# Patient Record
Sex: MALE | URBAN - METROPOLITAN AREA
[De-identification: names, ages, dates, MRNs, and addresses within clinical notes are randomized per-mention and may not be internally consistent; named-entity substitution may affect disease eponyms.]

---

## 2024-06-10 ENCOUNTER — NURSE TRIAGE (OUTPATIENT)
Dept: NURSING | Facility: CLINIC | Age: 44
End: 2024-06-10

## 2024-06-10 NOTE — TELEPHONE ENCOUNTER
Nurse Triage SBAR    Is this a 2nd Level Triage? NO    Situation:   Chest pain/difficulty breathing    Background:   Pt had a fall on Thursday.  He was walking and rolled his ankle.  He tried to catch himself but ended up tripping and he fell on his left side.  He fell on his arm and felt the pain in his left chest and arm.    Assessment:   He thought he was improving as his chest pain was getting better.  But tonight, he woke up with pain worsening and felt like he couldn't breathe.  He got up and walked around.  He feels better after leaning over a table and feels like he can take a deeper breath and pain is mild.  He is speaking in sentences.  He is concerned about a collapsed lung.    Protocol Recommended Disposition:   Go to ED Now    Recommendation:   Advised pt to be seen in the ED tonight.  Care advice given.  Pt verbalized understanding.  He plans to wait about an hour to see if his pain or breathing improves.    Albania Yanez RN, BSN Nurse Triage Advisor 6/10/2024 3:31 AM      Reason for Disposition   [1] Difficulty breathing AND [2] not severe    Additional Information   Negative: Major injury from dangerous force or speed (e.g., MVA, fall > 10 feet or 3 meters)   Negative: Bullet wound, knife wound, or other penetrating object   Negative: Puncture wound that sounds life-threatening to the triager   Negative: SEVERE difficulty breathing (e.g., struggling for each breath, speaks in single words)   Negative: [1] Major bleeding (e.g., actively dripping or spurting) AND [2] can't be stopped   Negative: Open wound of the chest with sound of moving air (sucking wound) or visible air bubbles   Negative: Coughing or spitting up blood   Negative: Shock suspected (e.g., cold/pale/clammy skin, too weak to stand, low BP, rapid pulse)   Negative: Bluish (or gray) lips or face now   Negative: Unconscious or was unconscious   Negative: Sounds like a life-threatening emergency to the triager   Negative: SEVERE chest  pain    Protocols used: Chest Injury-A-AH